# Patient Record
Sex: MALE | Race: WHITE | Employment: FULL TIME | URBAN - METROPOLITAN AREA
[De-identification: names, ages, dates, MRNs, and addresses within clinical notes are randomized per-mention and may not be internally consistent; named-entity substitution may affect disease eponyms.]

---

## 2018-07-14 ENCOUNTER — HOSPITAL ENCOUNTER (EMERGENCY)
Facility: CLINIC | Age: 28
Discharge: HOME | End: 2018-07-14
Attending: FAMILY MEDICINE | Admitting: FAMILY MEDICINE
Payer: OTHER MISCELLANEOUS

## 2018-07-14 VITALS
HEIGHT: 64 IN | OXYGEN SATURATION: 100 % | DIASTOLIC BLOOD PRESSURE: 70 MMHG | WEIGHT: 160 LBS | BODY MASS INDEX: 27.31 KG/M2 | TEMPERATURE: 98.2 F | SYSTOLIC BLOOD PRESSURE: 119 MMHG

## 2018-07-14 DIAGNOSIS — E86.0 DEHYDRATION: ICD-10-CM

## 2018-07-14 LAB
ANION GAP SERPL CALCULATED.3IONS-SCNC: 7 MMOL/L (ref 3–14)
BUN SERPL-MCNC: 19 MG/DL (ref 7–30)
CALCIUM SERPL-MCNC: 9.1 MG/DL (ref 8.5–10.1)
CHLORIDE SERPL-SCNC: 101 MMOL/L (ref 94–109)
CO2 SERPL-SCNC: 27 MMOL/L (ref 20–32)
CREAT SERPL-MCNC: 1.27 MG/DL (ref 0.66–1.25)
GFR SERPL CREATININE-BSD FRML MDRD: 68 ML/MIN/1.7M2
GLUCOSE SERPL-MCNC: 113 MG/DL (ref 70–99)
POTASSIUM SERPL-SCNC: 4.2 MMOL/L (ref 3.4–5.3)
SODIUM SERPL-SCNC: 135 MMOL/L (ref 133–144)

## 2018-07-14 PROCEDURE — 96360 HYDRATION IV INFUSION INIT: CPT

## 2018-07-14 PROCEDURE — 25000128 H RX IP 250 OP 636: Performed by: FAMILY MEDICINE

## 2018-07-14 PROCEDURE — 25000125 ZZHC RX 250: Performed by: FAMILY MEDICINE

## 2018-07-14 PROCEDURE — 99284 EMERGENCY DEPT VISIT MOD MDM: CPT | Mod: 25

## 2018-07-14 PROCEDURE — 99284 EMERGENCY DEPT VISIT MOD MDM: CPT | Mod: Z6 | Performed by: FAMILY MEDICINE

## 2018-07-14 PROCEDURE — 80048 BASIC METABOLIC PNL TOTAL CA: CPT | Performed by: FAMILY MEDICINE

## 2018-07-14 RX ORDER — SODIUM CHLORIDE 9 MG/ML
1000 INJECTION, SOLUTION INTRAVENOUS CONTINUOUS
Status: DISCONTINUED | OUTPATIENT
Start: 2018-07-14 | End: 2018-07-14 | Stop reason: HOSPADM

## 2018-07-14 RX ORDER — ONDANSETRON 4 MG/1
4 TABLET, ORALLY DISINTEGRATING ORAL ONCE
Status: COMPLETED | OUTPATIENT
Start: 2018-07-14 | End: 2018-07-14

## 2018-07-14 RX ADMIN — ONDANSETRON 4 MG: 4 TABLET, ORALLY DISINTEGRATING ORAL at 16:00

## 2018-07-14 RX ADMIN — SODIUM CHLORIDE 1000 ML: 9 INJECTION, SOLUTION INTRAVENOUS at 16:52

## 2018-07-14 ASSESSMENT — ENCOUNTER SYMPTOMS
DIAPHORESIS: 1
DIARRHEA: 0
CHILLS: 0
VOMITING: 0
FEVER: 0
NAUSEA: 0
SORE THROAT: 0
RHINORRHEA: 0
SHORTNESS OF BREATH: 0
LIGHT-HEADEDNESS: 1
WEAKNESS: 1
COUGH: 0
DIZZINESS: 1

## 2018-07-14 NOTE — ED AVS SNAPSHOT
[facility] Emergency Department    [address] [address]    Phone:  [phone]    Fax:  [phone]                                       Aramis Perez   MRN: [id number]    Department:  [facility] Emergency Department   Date of Visit:  7/14/2018           After Visit Summary Signature Page     I have received my discharge instructions, and my questions have been answered. I have discussed any challenges I see with this plan with the nurse or doctor.    ..........................................................................................................................................  Patient/Patient Representative Signature      ..........................................................................................................................................  Patient Representative Print Name and Relationship to Patient    ..................................................               ................................................  Date                                            Time    ..........................................................................................................................................  Reviewed by Signature/Title    ...................................................              ..............................................  Date                                                            Time

## 2018-07-14 NOTE — DISCHARGE INSTRUCTIONS
Dehydration (Adult)  Dehydration occurs when your body loses too much fluid. This may be the result of prolonged vomiting or diarrhea, excessive sweating, or a high fever. It may also happen if you don t drink enough fluid when you re sick or out in the heat. Misuse of diuretics (water pills) can also be a cause.  Symptoms include thirst and decreased urine output. You may also feel dizzy, weak, fatigued, or very drowsy. The diet described below is usually enough to treat dehydration. In some cases, you may need medicine.  Home care    Drink at least 12 8-ounce glasses of fluid every day to resolve the dehydration. Fluid may include water; orange juice; lemonade; apple, grape, or cranberry juice; clear fruit drinks; electrolyte replacement and sports drinks; and teas and coffee without caffeine. Don't drink alcohol. If you have been diagnosed with a kidney disease, ask your doctor how much and what types of fluids you should drink to prevent dehydration. If you have kidney disease, fluid can build up in the body. This can be dangerous to your health.    If you have a fever, muscle aches, or a headache as a result of a cold or flu, you may take acetaminophen or ibuprofen, unless another medicine was prescribed. If you have chronic liver or kidney disease, or have ever had a stomach ulcer or gastrointestinal bleeding, talk with your healthcare provider before using these medicines. Don't take aspirin if you are younger than 18 and have a fever. Aspirin raises the chance for severe liver injury.  Follow-up care  Follow up with your healthcare provider, or as advised.  When to seek medical advice  Call your healthcare provider right away if any of these occur:    Continued vomiting    Frequent diarrhea (more than 5 times a day); blood (red or black color) or mucus in diarrhea    Blood in vomit or stool    Swollen abdomen or increasing abdominal pain    Weakness, dizziness, or fainting    Unusual drowsiness or  confusion    Reduced urine output or extreme thirst    Fever of 100.4 F (38 C) or higher  Date Last Reviewed: 5/1/2017 2000-2017 [employer]. [address] [place] [address], [place] [address]. All rights reserved. This information is not intended as a substitute for professional medical care. Always follow your healthcare professional's instructions.

## 2018-07-14 NOTE — ED PROVIDER NOTES
"  History     Chief Complaint   Patient presents with     Dehydration     working all day in sun, started feeling weak and dizzy about an hour ago, sat in the truck with air and was taking in fluids all day with no urine output      HPI  Aramis Perez is a 28 year old male who presents with possible dehydration.  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  He has been drinking fluids through the day but may not have been able to keep up with the insensible losses.  He has had decreased urine output.    He takes 2 medications, one for depression and one for asthma.    He has not been sick otherwise lately.    She is feeling somewhat better since lying in the air-conditioned room and drinking water.    Problem List:    There are no active problems to display for this patient.       Past Medical History:    No past medical history on file.    Past Surgical History:    No past surgical history on file.    Family History:    No family history on file.    Social History:  Marital Status:   [2]  Social History   Substance Use Topics     Smoking status: Not on file     Smokeless tobacco: Not on file     Alcohol use Not on file        Medications:      No current outpatient prescriptions on file.      Review of Systems   Constitutional: Positive for diaphoresis. Negative for chills and fever.   HENT: Negative for congestion, ear pain, rhinorrhea and sore throat.    Respiratory: Negative for cough and shortness of breath.    Gastrointestinal: Negative for diarrhea, nausea and vomiting.   Neurological: Positive for dizziness, weakness and light-headedness.       Physical Exam   BP: 115/74  Heart Rate: 79  Temp: 98.2  F (36.8  C)  Height: 162.6 cm (5' 4\")  Weight: 72.6 kg (160 lb)  SpO2: 97 %      Physical Exam   Constitutional: He appears well-developed and well-nourished. No distress.   HENT:   Head: Normocephalic and atraumatic.   Mouth/Throat: Oropharynx " is clear and moist.   Eyes: No scleral icterus.   Neck: Normal range of motion. Neck supple.   Lymphadenopathy:     He has no cervical adenopathy.   Neurological: He is alert.   Skin: Skin is warm. No rash noted. He is diaphoretic. No erythema. No pallor.   Psychiatric: He has a normal mood and affect.       ED Course     ED Course     Procedures               Critical Care time:  none               Results for orders placed or performed during the hospital encounter of 07/14/18 (from the past 24 hour(s))   Basic metabolic panel   Result Value Ref Range    Sodium 135 133 - 144 mmol/L    Potassium 4.2 3.4 - 5.3 mmol/L    Chloride 101 94 - 109 mmol/L    Carbon Dioxide 27 20 - 32 mmol/L    Anion Gap 7 3 - 14 mmol/L    Glucose 113 (H) 70 - 99 mg/dL    Urea Nitrogen 19 7 - 30 mg/dL    Creatinine 1.27 (H) 0.66 - 1.25 mg/dL    GFR Estimate 68 >60 mL/min/1.7m2    GFR Estimate If Black 82 >60 mL/min/1.7m2    Calcium 9.1 8.5 - 10.1 mg/dL       Medications   0.9% sodium chloride BOLUS (1,000 mLs Intravenous New Bag 7/14/18 1652)     Followed by   sodium chloride 0.9% infusion (not administered)   ondansetron (ZOFRAN-ODT) ODT tab 4 mg (4 mg Oral Given 7/14/18 1600)       Assessments & Plan (with Medical Decision Making)     I have reviewed the nursing notes.    I have reviewed the findings, diagnosis, plan and need for follow up with the patient.   Patient's labs were reviewed and discussed with him.  He was treated with 1 L normal saline IV.  And after obtaining the IV hydration and lying in the air conditioned room he is feeling considerably better.  He will be released to home with some instructions on how to better manage the heat while he is out working on the roads.    New Prescriptions    No medications on file       Final diagnoses:   Dehydration       7/14/2018   [facility] EMERGENCY DEPARTMENT     Luan Cunningham MD  07/14/18 0467

## 2018-07-14 NOTE — ED NOTES
Pt presents to ED concerned that he is dehydrated. Pt states he has been shoveling concrete in the heat all day today and suddenly felt like he was intoxicated. Pt reports he had been drinking fluids, no vomiting or diarrhea. Pt admits to some nausea. Pt reports his mental status is now clearing.

## 2018-09-13 ENCOUNTER — NURSE TRIAGE (OUTPATIENT)
Dept: NURSING | Facility: CLINIC | Age: 28
End: 2018-09-13

## 2019-04-01 ENCOUNTER — APPOINTMENT (OUTPATIENT)
Dept: OCCUPATIONAL MEDICINE | Facility: CLINIC | Age: 29
End: 2019-04-01

## 2019-04-01 PROCEDURE — 99000 SPECIMEN HANDLING OFFICE-LAB: CPT | Performed by: FAMILY MEDICINE

## 2020-03-12 ENCOUNTER — APPOINTMENT (OUTPATIENT)
Dept: GENERAL RADIOLOGY | Facility: CLINIC | Age: 30
End: 2020-03-12
Attending: FAMILY MEDICINE
Payer: OTHER MISCELLANEOUS

## 2020-03-12 ENCOUNTER — HOSPITAL ENCOUNTER (EMERGENCY)
Facility: CLINIC | Age: 30
Discharge: HOME | End: 2020-03-12
Attending: FAMILY MEDICINE | Admitting: FAMILY MEDICINE
Payer: OTHER MISCELLANEOUS

## 2020-03-12 VITALS
TEMPERATURE: 98.2 F | RESPIRATION RATE: 16 BRPM | OXYGEN SATURATION: 98 % | HEART RATE: 83 BPM | BODY MASS INDEX: 30.9 KG/M2 | WEIGHT: 180 LBS | SYSTOLIC BLOOD PRESSURE: 144 MMHG | DIASTOLIC BLOOD PRESSURE: 83 MMHG

## 2020-03-12 DIAGNOSIS — S46.211A BICEPS RUPTURE, DISTAL, RIGHT, INITIAL ENCOUNTER: ICD-10-CM

## 2020-03-12 PROBLEM — G47.33 OSA (OBSTRUCTIVE SLEEP APNEA): Status: ACTIVE | Noted: 2018-11-28

## 2020-03-12 PROBLEM — J45.20 MILD INTERMITTENT ASTHMA: Status: ACTIVE | Noted: 2017-05-17

## 2020-03-12 PROBLEM — J45.31 MILD PERSISTENT ASTHMA WITH ACUTE EXACERBATION: Status: ACTIVE | Noted: 2018-11-28

## 2020-03-12 PROCEDURE — 99284 EMERGENCY DEPT VISIT MOD MDM: CPT | Mod: Z6 | Performed by: FAMILY MEDICINE

## 2020-03-12 PROCEDURE — 73070 X-RAY EXAM OF ELBOW: CPT | Mod: RT

## 2020-03-12 PROCEDURE — 99284 EMERGENCY DEPT VISIT MOD MDM: CPT

## 2020-03-12 NOTE — ED TRIAGE NOTES
Working yesterday and went to lift a wrench and felt and heard pop in upper forearm/bicep area. Initial pain sharp/shooting, pain currently aching/dull. Tender to palpation. Shoulder surgery on right side ~ 1 yr ago. Pt states increased pain with twisting movements.

## 2020-03-12 NOTE — ED AVS SNAPSHOT
[facility] Emergency Department  [address] [facility] [address]  Phone:  [phone]  Fax:  [phone]                                    Aramis Perez   MRN: [id number]    Department:  [facility] Emergency Department   Date of Visit:  3/12/2020           After Visit Summary Signature Page    I have received my discharge instructions, and my questions have been answered. I have discussed any challenges I see with this plan with the nurse or doctor.    ..........................................................................................................................................  Patient/Patient Representative Signature      ..........................................................................................................................................  Patient Representative Print Name and Relationship to Patient    ..................................................               ................................................  Date                                   Time    ..........................................................................................................................................  Reviewed by Signature/Title    ...................................................              ..............................................  Date                                               Time          22EPIC Rev 08/18

## 2020-03-12 NOTE — ED PROVIDER NOTES
History     Chief Complaint   Patient presents with     Arm Pain     work related right bicep injury     HPI  Aramis Perez is a 30 year old male left handed who presents with work related injury - [employer] when lifting a wrench yesterday 3/11/2020 with right hand and without force, torque, and only a light wrench and then sudden onset of popping sensation and pain between the mid-humeral region and the proximal forearm in region of biceps.  no recent fluoroquinolones.   history of asthma - but last prednisone 5 years ago and no inhaled steroid.  prior rotator cuff surgery RT shoulder - 1 year ago.  no other right arm conditions.   initial paresthesias into right hand.    Allergies:  Allergies   Allergen Reactions     Amoxicillin Nausea and Vomiting       Problem List:    Patient Active Problem List    Diagnosis Date Noted     Mild persistent asthma with acute exacerbation 11/28/2018     Priority: Medium     MORRO (obstructive sleep apnea) 11/28/2018     Priority: Medium     Overview:   X a few yrs ,        Mild intermittent asthma 05/17/2017     Priority: Medium     Sarcoidosis 04/23/2016     Priority: Medium     Allergic rhinitis due to pollen 04/30/2010     Priority: Medium     Severe episode of recurrent major depressive disorder (H) 12/15/2008     Priority: Medium     Scoliosis (and kyphoscoliosis), idiopathic 01/25/2005     Priority: Medium     Other acne 01/11/2005     Priority: Medium        Past Medical History:    No past medical history on file.    Past Surgical History:    No past surgical history on file.    Family History:    No family history on file.    Social History:  Marital Status:   [2]  Social History     Tobacco Use     Smoking status: Not on file   Substance Use Topics     Alcohol use: Not on file     Drug use: Not on file        Medications:    No current outpatient medications on file.        Review of Systems   ROS:  5 point ROS negative except as noted above in HPI, including Gen.,  Resp., CV, GI &  system review.      Physical Exam   BP: (!) 144/83  Pulse: 83  Heart Rate: 83  Temp: 98.2  F (36.8  C)  Resp: 16  Weight: 81.6 kg (180 lb)  SpO2: 98 %      Physical Exam   Examination of the right arm reveals no change in range of motion of the shoulder or wrist.  No obvious ecchymosis over the forearm or humerus/antecubital space.  Tenderness to palpation in the antecubital space but no obvious fullness here.  No obvious deformity.  He has a sense of weakness on forearm flexion with the biceps but this is not demonstrated on his examination.  There is no medial or lateral epicondyle tenderness and is supination and pronation are full.  Normal distal pulses.  Normal distal ulnar median and radial nerve function motor and sensory.  Normal radial pulse.  Normal distal capillary refill.    ED Course        Procedures               Critical Care time:  none               Results for orders placed or performed during the hospital encounter of 03/12/20 (from the past 24 hour(s))   Elbow XR, 2 views, right    Narrative    RIGHT ELBOW TWO VIEWS   3/12/2020 10:01 AM     HISTORY: Suspect distal biceps rupture at proximal radius.  Evaluate  for avulsion fracture.    COMPARISON: None.      Impression    IMPRESSION: No evidence of avulsion fracture involving the proximal  radius or radial tuberosity. There is a small corticated ossicle  adjacent to the lateral aspect of the ulna at the ulnotrochlear joint  that is likely either a nonunited ossification center or old avulsion  injury. No joint effusion.    RACHEL WU MD       Medications - No data to display    Assessments & Plan (with Medical Decision Making)     MDM: Aramis KASSIDY Perez is a 30 year old male who presented with likely distal biceps partial rupture without significant changes on exam but his history is suggestive and I recommended that he have close interval follow-up with ortho.  I asked him to avoid extension of the arm, but I avoided sling  and splinting due to his history of prior rotator cuff tear on this side and risk of frozen shoulder.  In addition my understanding is these ruptures are typically not repaired surgically but the close interval follow-up will determine if there is any intervention needed.  X-ray was performed with results coming back after the patient was discharged, as I told him I doubted the x-ray would modify management but it might help define the injury if demonstrating an avulsion fracture.  There were no acute changes at the medial elbow that are likely old. Unlikely to be related to today's findings.    I did complete a work note with restrictions until follow-up.  Precautions given for return.    I have reviewed the nursing notes.    I have reviewed the findings, diagnosis, plan and need for follow up with the patient.       New Prescriptions    No medications on file       Final diagnoses:   Biceps rupture, distal, right, initial encounter - follow-up ortho within next 3-5 days.  keep arm in flexion and avoid extension at elbow.  work restriction.       3/12/2020   [facility] EMERGENCY DEPARTMENT     Venkat Edwards MD  03/12/20 1957       Venkat Edwards MD  03/12/20 1958

## 2020-03-12 NOTE — DISCHARGE INSTRUCTIONS
ICD-10-CM    1. Biceps rupture, distal, right, initial encounter  S46.211A Orthopedic & Spine  Referral    follow-up ortho within next 3-5 days.  keep arm in flexion and avoid extension at elbow.  work restriction.
